# Patient Record
Sex: FEMALE | Race: BLACK OR AFRICAN AMERICAN | NOT HISPANIC OR LATINO | URBAN - NONMETROPOLITAN AREA
[De-identification: names, ages, dates, MRNs, and addresses within clinical notes are randomized per-mention and may not be internally consistent; named-entity substitution may affect disease eponyms.]

---

## 2019-05-23 ENCOUNTER — IMPORTED ENCOUNTER (OUTPATIENT)
Dept: URBAN - NONMETROPOLITAN AREA CLINIC 1 | Facility: CLINIC | Age: 54
End: 2019-05-23

## 2019-05-23 PROCEDURE — 92014 COMPRE OPH EXAM EST PT 1/>: CPT

## 2019-05-23 PROCEDURE — 92015 DETERMINE REFRACTIVE STATE: CPT

## 2021-09-16 NOTE — PATIENT DISCUSSION
BF rx given.   Removes glasses for computer.; 's Notes: [employer]
patient denies any pain however history limited due to dementia

## 2021-12-08 ENCOUNTER — IMPORTED ENCOUNTER (OUTPATIENT)
Dept: URBAN - NONMETROPOLITAN AREA CLINIC 1 | Facility: CLINIC | Age: 56
End: 2021-12-08

## 2021-12-08 PROCEDURE — 92015 DETERMINE REFRACTIVE STATE: CPT

## 2021-12-08 PROCEDURE — 92014 COMPRE OPH EXAM EST PT 1/>: CPT

## 2021-12-08 NOTE — PATIENT DISCUSSION
Myopia-Discussed diagnosis with patient. -Explained that people who are myopic are at a higher risk for developing RD/RT and reviewed associated S&S.-Pt to contact our office if symptoms develop. Astigmatism-Discussed diagnosis with patient. Updated spec Rx given. Recommend lens that will provide comfort as well as protect safety and health of eyes.; Dr's Notes: [employer].

## 2022-04-09 ASSESSMENT — TONOMETRY
OS_IOP_MMHG: 19
OD_IOP_MMHG: 17
OD_IOP_MMHG: 19
OS_IOP_MMHG: 19

## 2022-04-09 ASSESSMENT — VISUAL ACUITY
OD_SC: 20/25+2
OS_SC: 20/30-2
OD_SC: 20/30
OS_CC: J2
OD_CC: J2
OS_SC: 20/25

## 2024-03-07 ENCOUNTER — ESTABLISHED PATIENT (OUTPATIENT)
Dept: RURAL CLINIC 1 | Facility: CLINIC | Age: 59
End: 2024-03-07

## 2024-03-07 DIAGNOSIS — H52.4: ICD-10-CM

## 2024-03-07 DIAGNOSIS — H52.13: ICD-10-CM

## 2024-03-07 DIAGNOSIS — H52.223: ICD-10-CM

## 2024-03-07 PROCEDURE — 92014 COMPRE OPH EXAM EST PT 1/>: CPT

## 2024-03-07 PROCEDURE — 92015 DETERMINE REFRACTIVE STATE: CPT

## 2024-03-07 ASSESSMENT — VISUAL ACUITY
OS_SC: 20/25
OD_SC: 20/25
OS_SC: 20/25-1
OU_SC: 20/20
OD_SC: 20/25-1
OU_SC: -1

## 2024-03-07 ASSESSMENT — TONOMETRY
OS_IOP_MMHG: 18
OD_IOP_MMHG: 18

## 2024-12-17 ENCOUNTER — EMERGENCY VISIT (OUTPATIENT)
Age: 59
End: 2024-12-17

## 2024-12-17 DIAGNOSIS — H00.15: ICD-10-CM

## 2024-12-17 PROCEDURE — 99213 OFFICE O/P EST LOW 20 MIN: CPT

## 2025-06-02 ENCOUNTER — COMPREHENSIVE EXAM (OUTPATIENT)
Age: 60
End: 2025-06-02

## 2025-06-02 DIAGNOSIS — H52.4: ICD-10-CM

## 2025-06-02 DIAGNOSIS — H52.13: ICD-10-CM

## 2025-06-02 DIAGNOSIS — H52.223: ICD-10-CM

## 2025-06-02 PROCEDURE — 92014 COMPRE OPH EXAM EST PT 1/>: CPT

## 2025-06-02 PROCEDURE — 92015 DETERMINE REFRACTIVE STATE: CPT
